# Patient Record
Sex: FEMALE | Race: OTHER | NOT HISPANIC OR LATINO | Employment: FULL TIME | URBAN - METROPOLITAN AREA
[De-identification: names, ages, dates, MRNs, and addresses within clinical notes are randomized per-mention and may not be internally consistent; named-entity substitution may affect disease eponyms.]

---

## 2025-04-16 ENCOUNTER — APPOINTMENT (OUTPATIENT)
Dept: RADIOLOGY | Facility: HOSPITAL | Age: 30
DRG: 156 | End: 2025-04-16
Payer: COMMERCIAL

## 2025-04-16 ENCOUNTER — HOSPITAL ENCOUNTER (INPATIENT)
Facility: HOSPITAL | Age: 30
LOS: 2 days | Discharge: HOME | DRG: 156 | End: 2025-04-19
Attending: EMERGENCY MEDICINE | Admitting: INTERNAL MEDICINE
Payer: COMMERCIAL

## 2025-04-16 DIAGNOSIS — R25.2 TRISMUS: ICD-10-CM

## 2025-04-16 DIAGNOSIS — K11.20 PAROTIDITIS: Primary | ICD-10-CM

## 2025-04-16 PROBLEM — B34.9 VIRAL SYNDROME: Status: ACTIVE | Noted: 2025-04-16

## 2025-04-16 PROBLEM — J45.20 MILD INTERMITTENT ASTHMA: Status: ACTIVE | Noted: 2025-04-16

## 2025-04-16 PROBLEM — R53.82 CHRONIC FATIGUE: Status: ACTIVE | Noted: 2025-04-16

## 2025-04-16 PROBLEM — R11.2 NAUSEA AND VOMITING: Status: ACTIVE | Noted: 2025-04-16

## 2025-04-16 PROBLEM — R22.0 SWELLING OF LEFT SIDE OF FACE: Status: ACTIVE | Noted: 2025-04-08

## 2025-04-16 PROBLEM — G47.00 INSOMNIA: Status: ACTIVE | Noted: 2025-04-16

## 2025-04-16 PROBLEM — R19.7 DIARRHEA: Status: ACTIVE | Noted: 2025-04-16

## 2025-04-16 PROBLEM — J45.909 ASTHMA: Status: ACTIVE | Noted: 2025-04-16

## 2025-04-16 PROBLEM — R71.8 MICROCYTOSIS: Status: ACTIVE | Noted: 2025-04-16

## 2025-04-16 LAB
ALBUMIN SERPL BCP-MCNC: 4.7 G/DL (ref 3.4–5)
ALP SERPL-CCNC: 28 U/L (ref 33–110)
ALT SERPL W P-5'-P-CCNC: 29 U/L (ref 7–45)
AMYLASE SERPL-CCNC: 64 U/L (ref 29–103)
ANION GAP SERPL CALC-SCNC: 14 MMOL/L (ref 10–20)
AST SERPL W P-5'-P-CCNC: 26 U/L (ref 9–39)
BASOPHILS # BLD AUTO: 0.03 X10*3/UL (ref 0–0.1)
BASOPHILS NFR BLD AUTO: 0.6 %
BILIRUB SERPL-MCNC: 0.4 MG/DL (ref 0–1.2)
BUN SERPL-MCNC: 11 MG/DL (ref 6–23)
CALCIUM SERPL-MCNC: 9.3 MG/DL (ref 8.6–10.3)
CHLORIDE SERPL-SCNC: 102 MMOL/L (ref 98–107)
CO2 SERPL-SCNC: 20 MMOL/L (ref 21–32)
CREAT SERPL-MCNC: 0.63 MG/DL (ref 0.5–1.05)
CRP SERPL-MCNC: 1.04 MG/DL
EGFRCR SERPLBLD CKD-EPI 2021: >90 ML/MIN/1.73M*2
EOSINOPHIL # BLD AUTO: 0.01 X10*3/UL (ref 0–0.7)
EOSINOPHIL NFR BLD AUTO: 0.2 %
ERYTHROCYTE [DISTWIDTH] IN BLOOD BY AUTOMATED COUNT: 16.8 % (ref 11.5–14.5)
ERYTHROCYTE [SEDIMENTATION RATE] IN BLOOD BY WESTERGREN METHOD: 57 MM/H (ref 0–20)
GLUCOSE SERPL-MCNC: 91 MG/DL (ref 74–99)
HCT VFR BLD AUTO: 46.1 % (ref 36–46)
HGB BLD-MCNC: 13.8 G/DL (ref 12–16)
IMM GRANULOCYTES # BLD AUTO: 0.03 X10*3/UL (ref 0–0.7)
IMM GRANULOCYTES NFR BLD AUTO: 0.6 % (ref 0–0.9)
LYMPHOCYTES # BLD AUTO: 0.37 X10*3/UL (ref 1.2–4.8)
LYMPHOCYTES NFR BLD AUTO: 7.6 %
MCH RBC QN AUTO: 21 PG (ref 26–34)
MCHC RBC AUTO-ENTMCNC: 29.9 G/DL (ref 32–36)
MCV RBC AUTO: 70 FL (ref 80–100)
MONOCYTES # BLD AUTO: 0.54 X10*3/UL (ref 0.1–1)
MONOCYTES NFR BLD AUTO: 11.1 %
NEUTROPHILS # BLD AUTO: 3.88 X10*3/UL (ref 1.2–7.7)
NEUTROPHILS NFR BLD AUTO: 79.9 %
NRBC BLD-RTO: 0 /100 WBCS (ref 0–0)
PLATELET # BLD AUTO: 312 X10*3/UL (ref 150–450)
POTASSIUM SERPL-SCNC: 4.2 MMOL/L (ref 3.5–5.3)
PROT SERPL-MCNC: 8.2 G/DL (ref 6.4–8.2)
RBC # BLD AUTO: 6.58 X10*6/UL (ref 4–5.2)
SODIUM SERPL-SCNC: 132 MMOL/L (ref 136–145)
WBC # BLD AUTO: 4.9 X10*3/UL (ref 4.4–11.3)

## 2025-04-16 PROCEDURE — G0378 HOSPITAL OBSERVATION PER HR: HCPCS

## 2025-04-16 PROCEDURE — 99223 1ST HOSP IP/OBS HIGH 75: CPT | Performed by: INTERNAL MEDICINE

## 2025-04-16 PROCEDURE — 2500000004 HC RX 250 GENERAL PHARMACY W/ HCPCS (ALT 636 FOR OP/ED): Performed by: NURSE PRACTITIONER

## 2025-04-16 PROCEDURE — 86140 C-REACTIVE PROTEIN: CPT | Performed by: NURSE PRACTITIONER

## 2025-04-16 PROCEDURE — 96367 TX/PROPH/DG ADDL SEQ IV INF: CPT

## 2025-04-16 PROCEDURE — 36415 COLL VENOUS BLD VENIPUNCTURE: CPT | Performed by: NURSE PRACTITIONER

## 2025-04-16 PROCEDURE — 87081 CULTURE SCREEN ONLY: CPT | Mod: PARLAB | Performed by: INTERNAL MEDICINE

## 2025-04-16 PROCEDURE — 99285 EMERGENCY DEPT VISIT HI MDM: CPT | Performed by: EMERGENCY MEDICINE

## 2025-04-16 PROCEDURE — 85025 COMPLETE CBC W/AUTO DIFF WBC: CPT | Performed by: NURSE PRACTITIONER

## 2025-04-16 PROCEDURE — 70491 CT SOFT TISSUE NECK W/DYE: CPT

## 2025-04-16 PROCEDURE — 87651 STREP A DNA AMP PROBE: CPT | Performed by: INTERNAL MEDICINE

## 2025-04-16 PROCEDURE — 80053 COMPREHEN METABOLIC PANEL: CPT | Performed by: NURSE PRACTITIONER

## 2025-04-16 PROCEDURE — 70491 CT SOFT TISSUE NECK W/DYE: CPT | Performed by: RADIOLOGY

## 2025-04-16 PROCEDURE — 85652 RBC SED RATE AUTOMATED: CPT | Performed by: NURSE PRACTITIONER

## 2025-04-16 PROCEDURE — 2550000001 HC RX 255 CONTRASTS: Performed by: NURSE PRACTITIONER

## 2025-04-16 PROCEDURE — 96365 THER/PROPH/DIAG IV INF INIT: CPT

## 2025-04-16 PROCEDURE — 82150 ASSAY OF AMYLASE: CPT | Performed by: EMERGENCY MEDICINE

## 2025-04-16 RX ORDER — KETOROLAC TROMETHAMINE 30 MG/ML
15 INJECTION, SOLUTION INTRAMUSCULAR; INTRAVENOUS EVERY 6 HOURS PRN
Status: DISCONTINUED | OUTPATIENT
Start: 2025-04-16 | End: 2025-04-19 | Stop reason: HOSPADM

## 2025-04-16 RX ORDER — VANCOMYCIN HYDROCHLORIDE 1 G/20ML
INJECTION, POWDER, LYOPHILIZED, FOR SOLUTION INTRAVENOUS DAILY PRN
Status: DISCONTINUED | OUTPATIENT
Start: 2025-04-16 | End: 2025-04-18

## 2025-04-16 RX ORDER — LIDOCAINE HYDROCHLORIDE 20 MG/ML
1.25 SOLUTION OROPHARYNGEAL AS NEEDED
Status: DISCONTINUED | OUTPATIENT
Start: 2025-04-16 | End: 2025-04-19 | Stop reason: HOSPADM

## 2025-04-16 RX ORDER — CEFAZOLIN SODIUM 2 G/100ML
2 INJECTION, SOLUTION INTRAVENOUS ONCE
Status: COMPLETED | OUTPATIENT
Start: 2025-04-16 | End: 2025-04-16

## 2025-04-16 RX ORDER — ENOXAPARIN SODIUM 100 MG/ML
40 INJECTION SUBCUTANEOUS EVERY 24 HOURS
Status: DISCONTINUED | OUTPATIENT
Start: 2025-04-16 | End: 2025-04-19 | Stop reason: HOSPADM

## 2025-04-16 RX ORDER — ONDANSETRON HYDROCHLORIDE 2 MG/ML
4 INJECTION, SOLUTION INTRAVENOUS EVERY 6 HOURS PRN
Status: DISCONTINUED | OUTPATIENT
Start: 2025-04-16 | End: 2025-04-19 | Stop reason: HOSPADM

## 2025-04-16 RX ORDER — ACETAMINOPHEN 325 MG/1
975 TABLET ORAL EVERY 6 HOURS PRN
Status: DISCONTINUED | OUTPATIENT
Start: 2025-04-16 | End: 2025-04-19 | Stop reason: HOSPADM

## 2025-04-16 RX ADMIN — AMPICILLIN SODIUM AND SULBACTAM SODIUM 3 G: 2; 1 INJECTION, POWDER, FOR SOLUTION INTRAMUSCULAR; INTRAVENOUS at 23:43

## 2025-04-16 RX ADMIN — IOHEXOL 75 ML: 350 INJECTION, SOLUTION INTRAVENOUS at 19:10

## 2025-04-16 RX ADMIN — CEFAZOLIN SODIUM 2 G: 2 INJECTION, SOLUTION INTRAVENOUS at 22:06

## 2025-04-16 ASSESSMENT — PAIN SCALES - GENERAL
PAINLEVEL_OUTOF10: 0 - NO PAIN
PAINLEVEL_OUTOF10: 8

## 2025-04-16 ASSESSMENT — COLUMBIA-SUICIDE SEVERITY RATING SCALE - C-SSRS
2. HAVE YOU ACTUALLY HAD ANY THOUGHTS OF KILLING YOURSELF?: NO
6. HAVE YOU EVER DONE ANYTHING, STARTED TO DO ANYTHING, OR PREPARED TO DO ANYTHING TO END YOUR LIFE?: NO
1. IN THE PAST MONTH, HAVE YOU WISHED YOU WERE DEAD OR WISHED YOU COULD GO TO SLEEP AND NOT WAKE UP?: NO

## 2025-04-16 ASSESSMENT — PAIN DESCRIPTION - PAIN TYPE: TYPE: ACUTE PAIN

## 2025-04-16 ASSESSMENT — PAIN - FUNCTIONAL ASSESSMENT: PAIN_FUNCTIONAL_ASSESSMENT: 0-10

## 2025-04-16 ASSESSMENT — PAIN DESCRIPTION - ORIENTATION: ORIENTATION: LEFT

## 2025-04-16 ASSESSMENT — PAIN DESCRIPTION - LOCATION: LOCATION: FACE

## 2025-04-16 NOTE — ED TRIAGE NOTES
Pt coming in for swelling on the left side of her face. Had an ultrasound on the her face. Has been going on for around 6 weeks. Has been having headaches. Swelling will go from her eye down to her neck. ENT told her to come in if it gets worse and it got worse last night. States that it hurts to swallow.

## 2025-04-16 NOTE — ED TRIAGE NOTES
Secondary to patient volumes and overcrowding, I performed a brief medical screening exam of the patient in triage, as the patient awaits space in the main ED.    History of Present Illness:  Cipriano Zaidi presents with   Chief Complaint   Patient presents with    Facial Swelling     Pt coming in for swelling on the left side of her face. Had an ultrasound on the her face. Has been going on for around 6 weeks. Has been having headaches. Swelling will go from her eye down to her neck. ENT told her to come in if it gets worse and it got worse last night. States that it hurts to swallow.          Physical Exam:  General - In no acute distress  Respiratory - Breathing comfortably  Cardiac - Normal S1, S2, no m/g/r  Neurologic - Moving all extremities      Medical Decision Making:  Patient will require further evaluation in the main ED.    Initial diagnostic tests were ordered from triage.      The patient demonstrates understanding that this initial evaluation is a brief medical screening exam and the expectation is that they await for space in the main ED to be further evaluated.  The patient understands that, if they leave prior to further evaluation in the main ED after this initial evaluation in triage, they are doing so under their own accord knowing that their evaluation/work-up is not yet complete. The patient also understands that any preliminary diagnostic results, including abnormalities, may not be shared with them, if they choose to leave prior to further evaluation in the main ED.

## 2025-04-17 LAB — S PYO DNA THROAT QL NAA+PROBE: NOT DETECTED

## 2025-04-17 PROCEDURE — 1100000001 HC PRIVATE ROOM DAILY

## 2025-04-17 PROCEDURE — 99222 1ST HOSP IP/OBS MODERATE 55: CPT | Performed by: STUDENT IN AN ORGANIZED HEALTH CARE EDUCATION/TRAINING PROGRAM

## 2025-04-17 PROCEDURE — 2500000001 HC RX 250 WO HCPCS SELF ADMINISTERED DRUGS (ALT 637 FOR MEDICARE OP): Performed by: INTERNAL MEDICINE

## 2025-04-17 PROCEDURE — 99232 SBSQ HOSP IP/OBS MODERATE 35: CPT | Performed by: INTERNAL MEDICINE

## 2025-04-17 PROCEDURE — 2500000004 HC RX 250 GENERAL PHARMACY W/ HCPCS (ALT 636 FOR OP/ED): Performed by: INTERNAL MEDICINE

## 2025-04-17 RX ORDER — CETIRIZINE HYDROCHLORIDE 10 MG/1
10 TABLET ORAL DAILY
Status: DISCONTINUED | OUTPATIENT
Start: 2025-04-17 | End: 2025-04-19 | Stop reason: HOSPADM

## 2025-04-17 RX ORDER — L. ACIDOPHILUS/L.BULGARICUS 1MM CELL
1 TABLET ORAL DAILY
Status: DISCONTINUED | OUTPATIENT
Start: 2025-04-17 | End: 2025-04-19 | Stop reason: HOSPADM

## 2025-04-17 RX ORDER — LORATADINE 10 MG/1
10 TABLET ORAL DAILY
COMMUNITY

## 2025-04-17 RX ORDER — ALBUTEROL SULFATE 0.63 MG/3ML
0.63 SOLUTION RESPIRATORY (INHALATION) EVERY 6 HOURS PRN
COMMUNITY

## 2025-04-17 RX ORDER — ALBUTEROL SULFATE 0.63 MG/3ML
0.63 SOLUTION RESPIRATORY (INHALATION) EVERY 6 HOURS PRN
Status: DISCONTINUED | OUTPATIENT
Start: 2025-04-17 | End: 2025-04-19 | Stop reason: HOSPADM

## 2025-04-17 RX ADMIN — AMPICILLIN SODIUM AND SULBACTAM SODIUM 3 G: 2; 1 INJECTION, POWDER, FOR SOLUTION INTRAMUSCULAR; INTRAVENOUS at 20:15

## 2025-04-17 RX ADMIN — ACETAMINOPHEN 975 MG: 325 TABLET, FILM COATED ORAL at 22:55

## 2025-04-17 RX ADMIN — VANCOMYCIN HYDROCHLORIDE 1500 MG: 1.5 INJECTION, POWDER, LYOPHILIZED, FOR SOLUTION INTRAVENOUS at 23:37

## 2025-04-17 RX ADMIN — AMPICILLIN SODIUM AND SULBACTAM SODIUM 3 G: 2; 1 INJECTION, POWDER, FOR SOLUTION INTRAMUSCULAR; INTRAVENOUS at 06:21

## 2025-04-17 RX ADMIN — CETIRIZINE HYDROCHLORIDE 10 MG: 10 TABLET, FILM COATED ORAL at 13:17

## 2025-04-17 RX ADMIN — ACETAMINOPHEN 975 MG: 325 TABLET, FILM COATED ORAL at 09:44

## 2025-04-17 RX ADMIN — KETOROLAC TROMETHAMINE 15 MG: 30 INJECTION, SOLUTION INTRAMUSCULAR at 12:32

## 2025-04-17 RX ADMIN — VANCOMYCIN HYDROCHLORIDE 1500 MG: 1.5 INJECTION, POWDER, LYOPHILIZED, FOR SOLUTION INTRAVENOUS at 13:10

## 2025-04-17 RX ADMIN — ACETAMINOPHEN 975 MG: 325 TABLET, FILM COATED ORAL at 00:21

## 2025-04-17 RX ADMIN — AMPICILLIN SODIUM AND SULBACTAM SODIUM 3 G: 2; 1 INJECTION, POWDER, FOR SOLUTION INTRAMUSCULAR; INTRAVENOUS at 13:09

## 2025-04-17 RX ADMIN — VANCOMYCIN HYDROCHLORIDE 1500 MG: 1.5 INJECTION, POWDER, LYOPHILIZED, FOR SOLUTION INTRAVENOUS at 00:21

## 2025-04-17 RX ADMIN — KETOROLAC TROMETHAMINE 15 MG: 30 INJECTION, SOLUTION INTRAMUSCULAR at 03:28

## 2025-04-17 RX ADMIN — Medication 1 TABLET: at 20:15

## 2025-04-17 SDOH — SOCIAL STABILITY: SOCIAL INSECURITY: HAVE YOU HAD ANY THOUGHTS OF HARMING ANYONE ELSE?: NO

## 2025-04-17 SDOH — SOCIAL STABILITY: SOCIAL INSECURITY
WITHIN THE LAST YEAR, HAVE YOU BEEN RAPED OR FORCED TO HAVE ANY KIND OF SEXUAL ACTIVITY BY YOUR PARTNER OR EX-PARTNER?: NO

## 2025-04-17 SDOH — ECONOMIC STABILITY: FOOD INSECURITY: WITHIN THE PAST 12 MONTHS, THE FOOD YOU BOUGHT JUST DIDN'T LAST AND YOU DIDN'T HAVE MONEY TO GET MORE.: NEVER TRUE

## 2025-04-17 SDOH — SOCIAL STABILITY: SOCIAL INSECURITY
WITHIN THE LAST YEAR, HAVE YOU BEEN KICKED, HIT, SLAPPED, OR OTHERWISE PHYSICALLY HURT BY YOUR PARTNER OR EX-PARTNER?: NO

## 2025-04-17 SDOH — ECONOMIC STABILITY: FOOD INSECURITY: WITHIN THE PAST 12 MONTHS, YOU WORRIED THAT YOUR FOOD WOULD RUN OUT BEFORE YOU GOT THE MONEY TO BUY MORE.: NEVER TRUE

## 2025-04-17 SDOH — HEALTH STABILITY: PHYSICAL HEALTH
HOW OFTEN DO YOU NEED TO HAVE SOMEONE HELP YOU WHEN YOU READ INSTRUCTIONS, PAMPHLETS, OR OTHER WRITTEN MATERIAL FROM YOUR DOCTOR OR PHARMACY?: NEVER

## 2025-04-17 SDOH — SOCIAL STABILITY: SOCIAL INSECURITY: WITHIN THE LAST YEAR, HAVE YOU BEEN AFRAID OF YOUR PARTNER OR EX-PARTNER?: NO

## 2025-04-17 SDOH — SOCIAL STABILITY: SOCIAL INSECURITY: WITHIN THE LAST YEAR, HAVE YOU BEEN HUMILIATED OR EMOTIONALLY ABUSED IN OTHER WAYS BY YOUR PARTNER OR EX-PARTNER?: NO

## 2025-04-17 SDOH — SOCIAL STABILITY: SOCIAL INSECURITY: DOES ANYONE TRY TO KEEP YOU FROM HAVING/CONTACTING OTHER FRIENDS OR DOING THINGS OUTSIDE YOUR HOME?: NO

## 2025-04-17 SDOH — SOCIAL STABILITY: SOCIAL INSECURITY: DO YOU FEEL UNSAFE GOING BACK TO THE PLACE WHERE YOU ARE LIVING?: NO

## 2025-04-17 SDOH — ECONOMIC STABILITY: INCOME INSECURITY: IN THE PAST 12 MONTHS HAS THE ELECTRIC, GAS, OIL, OR WATER COMPANY THREATENED TO SHUT OFF SERVICES IN YOUR HOME?: NO

## 2025-04-17 SDOH — SOCIAL STABILITY: SOCIAL INSECURITY: HAVE YOU HAD THOUGHTS OF HARMING ANYONE ELSE?: NO

## 2025-04-17 SDOH — SOCIAL STABILITY: SOCIAL INSECURITY: ARE YOU OR HAVE YOU BEEN THREATENED OR ABUSED PHYSICALLY, EMOTIONALLY, OR SEXUALLY BY ANYONE?: NO

## 2025-04-17 SDOH — SOCIAL STABILITY: SOCIAL INSECURITY: WERE YOU ABLE TO COMPLETE ALL THE BEHAVIORAL HEALTH SCREENINGS?: YES

## 2025-04-17 SDOH — SOCIAL STABILITY: SOCIAL INSECURITY: DO YOU FEEL ANYONE HAS EXPLOITED OR TAKEN ADVANTAGE OF YOU FINANCIALLY OR OF YOUR PERSONAL PROPERTY?: NO

## 2025-04-17 SDOH — SOCIAL STABILITY: SOCIAL INSECURITY: ABUSE: ADULT

## 2025-04-17 SDOH — SOCIAL STABILITY: SOCIAL INSECURITY: HAS ANYONE EVER THREATENED TO HURT YOUR FAMILY OR YOUR PETS?: NO

## 2025-04-17 ASSESSMENT — LIFESTYLE VARIABLES
SKIP TO QUESTIONS 9-10: 1
AUDIT-C TOTAL SCORE: 0
HOW OFTEN DO YOU HAVE A DRINK CONTAINING ALCOHOL: NEVER
AUDIT-C TOTAL SCORE: 0
SUBSTANCE_ABUSE_PAST_12_MONTHS: NO
PRESCIPTION_ABUSE_PAST_12_MONTHS: NO
HOW OFTEN DO YOU HAVE 6 OR MORE DRINKS ON ONE OCCASION: NEVER
HOW MANY STANDARD DRINKS CONTAINING ALCOHOL DO YOU HAVE ON A TYPICAL DAY: PATIENT DOES NOT DRINK

## 2025-04-17 ASSESSMENT — COGNITIVE AND FUNCTIONAL STATUS - GENERAL
MOBILITY SCORE: 24
PATIENT BASELINE BEDBOUND: NO
MOBILITY SCORE: 24
DAILY ACTIVITIY SCORE: 24
DAILY ACTIVITIY SCORE: 24

## 2025-04-17 ASSESSMENT — ACTIVITIES OF DAILY LIVING (ADL)
HEARING - LEFT EAR: FUNCTIONAL
JUDGMENT_ADEQUATE_SAFELY_COMPLETE_DAILY_ACTIVITIES: YES
BATHING: INDEPENDENT
HEARING - RIGHT EAR: FUNCTIONAL
DRESSING YOURSELF: INDEPENDENT
TOILETING: INDEPENDENT
ADEQUATE_TO_COMPLETE_ADL: YES
PATIENT'S MEMORY ADEQUATE TO SAFELY COMPLETE DAILY ACTIVITIES?: YES
FEEDING YOURSELF: INDEPENDENT
WALKS IN HOME: INDEPENDENT
GROOMING: INDEPENDENT
LACK_OF_TRANSPORTATION: NO

## 2025-04-17 ASSESSMENT — PAIN - FUNCTIONAL ASSESSMENT
PAIN_FUNCTIONAL_ASSESSMENT: 0-10

## 2025-04-17 ASSESSMENT — PAIN DESCRIPTION - LOCATION
LOCATION: FACE
LOCATION: FACE
LOCATION: HEAD
LOCATION: FACE
LOCATION: JAW

## 2025-04-17 ASSESSMENT — PATIENT HEALTH QUESTIONNAIRE - PHQ9
SUM OF ALL RESPONSES TO PHQ9 QUESTIONS 1 & 2: 0
1. LITTLE INTEREST OR PLEASURE IN DOING THINGS: NOT AT ALL
2. FEELING DOWN, DEPRESSED OR HOPELESS: NOT AT ALL

## 2025-04-17 ASSESSMENT — PAIN SCALES - GENERAL
PAINLEVEL_OUTOF10: 4
PAINLEVEL_OUTOF10: 0 - NO PAIN
PAINLEVEL_OUTOF10: 2
PAINLEVEL_OUTOF10: 6
PAINLEVEL_OUTOF10: 4
PAINLEVEL_OUTOF10: 6
PAINLEVEL_OUTOF10: 0 - NO PAIN
PAINLEVEL_OUTOF10: 7

## 2025-04-17 ASSESSMENT — PAIN DESCRIPTION - ORIENTATION
ORIENTATION: LEFT
ORIENTATION: LEFT

## 2025-04-17 NOTE — CONSULTS
"Infectious disease progress note  Subjective   Cipriano Zaidi is a 29 y.o. female who presents to the hospital with complaints of L sided facial swelling. Symptoms have been ongoing for about 6 weeks.  Patient has undergone multiple courses of antibiotics including 2 course of azithromycin, Bactrim, and a course of Augmentin.  Patient presented to the hospital due to subjective fevers, increased pain in throat, increased swelling on the left side of her face, and difficulty swallowing.  In the ED patient was noted to have elevated ESR/CRP and CT imaging showing evidence of parotitis.  Patient denying any current fevers, chills, shortness of breath, difficulty breathing, past dental work, recent piercings, or any other new/acute symptoms.  Patient recently treated for ear infections with Augmentin.      Objective   Range of Vitals (last 24 hours)  Heart Rate:  []   Temp:  [36 °C (96.8 °F)-37.6 °C (99.7 °F)]   Resp:  [16-18]   BP: (116-141)/(58-83)   Height:  [154.9 cm (5' 1\")]   Weight:  [81.6 kg (180 lb)]   SpO2:  [95 %-99 %]   Daily Weight  04/16/25 : 81.6 kg (180 lb)    Body mass index is 34.01 kg/m².      Physical Exam  Physical Exam:  General:  Pleasant and cooperative. No apparent distress.  HEENT: Swollen submandibular and preauricular lymph nodes, left-sided facial swelling  Chest:  Clear to auscultation bilaterally. No wheezes, rales, or rhonchi.  CV:  Regular rate and rhythm. No murmurs    Abdomen: Abdomen is soft, non-tender, non-distended. BS +   Extremities:  No lower extremity edema or cyanosis.   Neurological:  AAOx3. No focal deficits.  Skin:  Warm and dry.        Relevant Results  Labs  Lab Results   Component Value Date    WBC 4.9 04/16/2025    HGB 13.8 04/16/2025    HCT 46.1 (H) 04/16/2025    MCV 70 (L) 04/16/2025     04/16/2025     Lab Results   Component Value Date    GLUCOSE 91 04/16/2025    CALCIUM 9.3 04/16/2025     (L) 04/16/2025    K 4.2 04/16/2025    CO2 20 (L) " 04/16/2025     04/16/2025    BUN 11 04/16/2025    CREATININE 0.63 04/16/2025   ESR: --  Lab Results   Component Value Date    SEDRATE 57 (H) 04/16/2025     Lab Results   Component Value Date    CRP 1.04 (H) 04/16/2025     Lab Results   Component Value Date    ALT 29 04/16/2025    AST 26 04/16/2025    ALKPHOS 28 (L) 04/16/2025    BILITOT 0.4 04/16/2025       Assessment/Plan   #Peritonitis, failed outpatient management  - Agree with ENT consult, evaluate for possible salivary stones  - Continue with Unasyn and vancomycin (started/17/25), may de-escalate Vanco if MRSA negative  - ID will continue to follow    I reviewed and interpreted all lab test imaging studies and documentations from other healthcare providers  I am monitoring for antibiotic side effects and toxicity     Michael Hatfield MD

## 2025-04-17 NOTE — H&P
"                                             Hospital Medicine History & Physical       Patient Name: Cipriano Zaidi   YOB: 1995    Subjective:    Cipriano Zaidi is a 29 y.o. female who presents to the hospital with complaints of L sided facial swelling. Symptoms have been ongoing for about 6 weeks. She has been treated with 2 courses of Azithromycin filled on 3/24 and 3/29. She also saw Dr. Andrew Rivas (ENT) and was started on Bactrim on 4/8. She was instructed to come to the ED if symptoms worsened and she felt like they did today so she came to our ED. She has had subjective fevers. She has pain in her throat, especially with swallowing. CT imaging showed evidence of parotitis     A 10 point ROS was completed and is negative expect as stated in HPI.     Past Medical History:  Asthma  Seasonal Allergies   Chronic Microcytosis without anemia     Past Surgical History:  None    Social History: Tobacco - Never, Alcohol - none, Recreational Drugs - none    Family History: family history is not on file.     Objective:    /83 (BP Location: Right arm)   Pulse (!) 104   Temp 36 °C (96.8 °F) (Tympanic)   Resp 16   Ht 1.549 m (5' 1\")   Wt 81.6 kg (180 lb)   SpO2 95%   BMI 34.01 kg/m²     Physical Exam:    GENERAL: well developed, non-toxic, NAD, alert & cooperative  HEENT: L facial swelling.   CARDIAC: regular rate & rhythm, S1/S2  PULMONARY: CTA b/l, no respiratory distress, - wheezes  ABDOMEN: soft, non-tender, non-distended  MSK: no peripheral edema, no obvious deformity  NEURO: A&O X 3, non-focal, CN II-XII grossly intact  SKIN: Warm and dry, no lesions, no rashes.  PSYCH: appropriate mood & affect     Assessment/Plan:    Parotiditis   Failed Outpatient management   Asthma      Will cover broadly with Unasyn and IV Vanc. Will check MRSA swab and if negative will stop Vanc. If there isnt improvement in the next 24 -48 hours consider non-infectious etiologies. +MMR vaccine. Consider ENT " consult if available. No fluid collections noted on imaging so nothing invasive needed right now.       DVT Prophylaxis: per protocol  Code Status: FULL CODE  Disposition: Home      Edvin Park, [facility]

## 2025-04-17 NOTE — CARE PLAN
The patient's goals for the shift include sleep, comfort and safety     The clinical goals for the shift include Patinet will remain safe and free from any injuyr for entire shift.

## 2025-04-17 NOTE — ED PROVIDER NOTES
HPI   Chief Complaint   Patient presents with    Facial Swelling     Pt coming in for swelling on the left side of her face. Had an ultrasound on the her face. Has been going on for around 6 weeks. Has been having headaches. Swelling will go from her eye down to her neck. ENT told her to come in if it gets worse and it got worse last night. States that it hurts to swallow.          Patient is a healthy nontoxic-appearing 29-year-old female past medical history of asthma, insomnia, diarrhea, macrocytosis, mild intermittent asthma, viral syndrome, presents emergency room today for complaint of left-sided facial swelling.  Patient states  symptoms have been ongoing getting worse over the past 6 weeks.  Patient states she has been on course of Augmentin, azithromycin, currently on Bactrim.  Patient symptoms have persisted and became worse today.  Patient states she does have some pain opening closing the jaw.  Patient denies any injuries trauma or falls, headache pain, visual disturbances, numbness or tingling.  Patient denies any difficulty controlling secretions or voice changes.  Patient denies any chest pain, shortness breath difficulty breathing, abdominal pain, nausea or vomiting, fever, shaking, or chills.              Patient History   Medical History[1]  Surgical History[2]  Family History[3]  Social History[4]    Physical Exam   ED Triage Vitals [04/16/25 1647]   Temp Heart Rate Resp BP   36 °C (96.8 °F) (!) 106 16 141/67      SpO2 Temp Source Heart Rate Source Patient Position   99 % Tympanic Monitor Sitting      BP Location FiO2 (%)     Right arm --       Physical Exam  Vitals and nursing note reviewed.   Constitutional:       General: She is not in acute distress.     Appearance: Normal appearance. She is well-developed and normal weight. She is not ill-appearing, toxic-appearing or diaphoretic.   HENT:      Head: Normocephalic and atraumatic.      Nose: No congestion or rhinorrhea.      Mouth/Throat:       Mouth: Mucous membranes are moist.      Pharynx: No oropharyngeal exudate or posterior oropharyngeal erythema.   Eyes:      General: No visual field deficit or scleral icterus.        Right eye: No discharge.         Left eye: No discharge.      Extraocular Movements: Extraocular movements intact.      Right eye: Normal extraocular motion and no nystagmus.      Left eye: Normal extraocular motion and no nystagmus.      Conjunctiva/sclera: Conjunctivae normal.      Pupils: Pupils are equal, round, and reactive to light. Pupils are equal.      Right eye: Pupil is round and reactive.      Left eye: Pupil is round and reactive.   Neck:      Vascular: No carotid bruit.      Comments: Reproducible tenderness upon palpation to the left jaw with mild trismus present, anterior cervical lymph node tenderness upon palpation as well, no stridor or wheezing auscultated over the neck, managing secretions appropriately  Cardiovascular:      Rate and Rhythm: Normal rate and regular rhythm.      Pulses: Normal pulses.      Heart sounds: Normal heart sounds. No murmur heard.     No friction rub. No gallop.   Pulmonary:      Effort: Pulmonary effort is normal. No respiratory distress.      Breath sounds: Normal breath sounds. No stridor. No wheezing, rhonchi or rales.   Chest:      Chest wall: No tenderness.   Musculoskeletal:         General: No swelling. Normal range of motion.      Cervical back: Normal range of motion and neck supple. Tenderness present. No rigidity.   Lymphadenopathy:      Cervical: Cervical adenopathy present.   Skin:     General: Skin is warm and dry.      Capillary Refill: Capillary refill takes less than 2 seconds.   Neurological:      General: No focal deficit present.      Mental Status: She is alert and oriented to person, place, and time.      GCS: GCS eye subscore is 4. GCS verbal subscore is 5. GCS motor subscore is 6.      Cranial Nerves: No cranial nerve deficit, dysarthria or facial asymmetry.       Sensory: No sensory deficit.      Motor: No weakness.      Coordination: Romberg sign negative. Coordination normal.      Gait: Gait normal.      Deep Tendon Reflexes: Reflexes normal.           ED Course & MDM   ED Course as of 04/17/25 0256   Wed Apr 16, 2025 2058 CT of the neck reveals  IMPRESSION:  Asymmetric enlargement and edema of the left parotid gland concerning  for parotiditis; please clinically correlate.      Asymmetric thickening and edema of the left platysma muscle may be  infectious/inflammatory in etiology.      Prominent cervical lymph nodes may be reactive in nature.      No evidence of organized fluid collection to suggest abscess.   [EC]   2139 Patient seen and examined, this is a 29 years old very pleasant female patient presented to the emergency department with a chief complaint of left facial swelling and neck pain as well as difficulty swallowing and trismus.  Stated that her condition started few weeks ago, she had 2 courses of Augmentin with no relief, stated that today her pain started to get worse and involving the neck as well as the difficulty swallowing.  Denies any fever, chills, body aches, recent sick contacts, stated that she is able to swallow but with some difficulty.  Denies chest pain, shortness of breath, cough, abdominal pain, weakness, or numbness.  Stated that she was vaccinated during childhood.    Review of system: As stated above in the HPI section.    Physical exam revealed a 29 years old female patient does not appear to be in acute distress.    No drooling, in no acute respiratory distress, no stridor, or wheezes.  There is tenderness over the left parotid gland as well as the left submandibular and neck region.  Patient has trismus, uvula is midline, no tongue or lip swelling, patient has mild to moderate limited extension to the neck.  Cardiopulmonary as well as abdominal and neurologic exam are unremarkable.    We obtained basic labs, inflammatory markers are  elevated, no leukocytosis, CT soft tissue neck with IV contrast dye revealed swelling to the left side of the face involving the platysma muscle as well as the parotid gland.  Amylase is added to the workup.  Will start the patient on Ancef IV and the patient will be admitted to medicine team given her outpatient treatment failure as well as worsening symptoms. [ME]      ED Course User Index  [EC] FANNY Sorensen-KATY  [ME] Alin Izaguirre DO         Diagnoses as of 04/17/25 0256   Parotiditis   Trismus                 No data recorded     Marisela Coma Scale Score: 15 (04/16/25 2126 : Martha Stafford RN)                           Medical Decision Making  Given patient's pain presentation a thorough exam was performed.  On exam patient has Reproducible tenderness upon palpation to the left jaw with mild trismus present, anterior cervical lymph node tenderness upon palpation as well, no stridor or wheezing auscultated over the neck, managing secretions appropriately, remains hemodynamically stable during emergency evaluation, is afebrile, no adventitious lung sounds auscultated, speaking clearly no distress, cardiac sound auscultated regular, have low suspicion for airway compromise, peritonsillar abscess, epiglottitis, David's angina.  CT of the soft tissue neck was ordered. CT of the soft tissue the neck as interpreted by radiologist results listed above with concern for parotitis.  I do suspect this is the cause of patient's symptoms.  Given patient has had failed outpatient therapy discussion was had with patient regards to admission for IV therapy and observation which she was agreeable with.  Patient was started on Ancef in the emergency room.  I consulted Dr. Park regards to admission.  Dr. Park has agreed admit patient to his service and will manage inpatient care.    TIFFANIE Sorensen     Portions of this note were generated using digital voice recognition software, and may contain grammatical  errors      Procedure  Procedures       [1]   Past Medical History:  Diagnosis Date    Personal history of diseases of the blood and blood-forming organs and certain disorders involving the immune mechanism     History of anemia    Personal history of diseases of the skin and subcutaneous tissue     History of eczema    Personal history of other infectious and parasitic diseases     History of varicella    Personal history of other mental and behavioral disorders     History of anxiety   [2] No past surgical history on file.  [3] No family history on file.  [4]   Social History  Tobacco Use    Smoking status: Never     Passive exposure: Never    Smokeless tobacco: Never   Vaping Use    Vaping status: Never Used   Substance Use Topics    Alcohol use: Never    Drug use: Never        FANNY Sorensen-CNP  04/17/25 0256

## 2025-04-17 NOTE — PROGRESS NOTES
"                                               Patient Name: Cipriano Zaidi   YOB: 1995    Subjective:  Pt reports moderate to severe pain in her left lower face down her left neck, hurts to swallow.       Objective:    /58   Pulse 82   Temp 36.4 °C (97.5 °F)   Resp 18   Ht 1.549 m (5' 1\")   Wt 81.6 kg (180 lb)   SpO2 98%   BMI 34.01 kg/m²     Physical Exam:    GEN: Awake and alert. Not in acute distress.   HEENT: Left lower face swollen and tender, left neck tender  CARDIO: Regular rate and rhythm.  No murmurs, rubs, or gallops. No LE edema  RESP: Clear to auscultation b/l. No wheezes, rales or rhonchi. Good respiratory effort.   ABD: +BS x4, soft, non-tender. Not distended. No rebound or guarding.  MSK: Grossly normal inspection. No joint swelling or obvious deformities. ROM intact  NEURO: A&O X 3. CN II-XII are grossly intact. No focal deficits.   SKIN: Warm and dry, no lesions, no rashes.  PSYCH: Appropriate mood and affect, no hallucinations.     Scheduled medications  Scheduled Medications[1]  Continuous medications  Continuous Medications[2]  PRN medications  PRN Medications[3]     Assessment and Plan:  Cipriano Zaidi is a 29 y.o. female   Assessment & Plan  Parotiditis    # Parotiditis  # Failure of outpatient treatment  # Asthma    Admit and treat for parotiditis, failure of outpatient treatment with 2 courses Azithromycin and also Bactrim.  Administer Unasyn and Vancomycin.  Pain control with Tylenol and Toradol.  CT soft tissue neck reviewed, showed enlargement and edema of left parotid gland.  Consulted ENT and infectious disease for additional recommendations.    Chevy Page DO  Senior Attending Physician  Hospital Medicine  Clinical Instructor             [1] ampicillin-sulbactam, 3 g, intravenous, q6h  cetirizine, 10 mg, oral, Daily  enoxaparin, 40 mg, subcutaneous, q24h  vancomycin, 1,500 mg, intravenous, q12h    [2]    [3] PRN medications: acetaminophen, albuterol, " ketorolac, lidocaine, ondansetron, vancomycin

## 2025-04-17 NOTE — CONSULTS
Reason For Consult  Left parotitis    History Of Present Illness  Cipriano Zaidi is a 29 y.o. female presenting with 6-8 weeks of left parotid swelling.    Patient seen with her mother and aunt present.    The patient reports that she was otherwise in good health when she had an upper respiratory infection some 6 to 8 weeks ago.  She developed swelling of the left parotid gland that was quite tender.  She has had several rounds of antibiotics including 2 rounds of Augmentin, a round of azithromycin, and a round of Bactrim without resolution of swelling.  She was seen by an outside ENT who thought it may be a reactive lymph node and advised patients.  She has tried sialagogues, parotid massage, and heat without improvement.  She has had waxing and waning exacerbations of swelling involving the entirety of the left face track into the eye and more recently has had worsening pain and swelling along the left neck.     Past Medical History  She has a past medical history of Personal history of diseases of the blood and blood-forming organs and certain disorders involving the immune mechanism, Personal history of diseases of the skin and subcutaneous tissue, Personal history of other infectious and parasitic diseases, and Personal history of other mental and behavioral disorders.    Surgical History  She has no past surgical history on file.     Social History  She reports that she has never smoked. She has never been exposed to tobacco smoke. She has never used smokeless tobacco. She reports that she does not drink alcohol and does not use drugs.    Family History  Family History[1]     Allergies  Mucinex [guaifenesin]    Review of Systems  Negative except as noted above in H&P.     Physical Exam  CONSTITUTIONAL: Well appearing female who appears stated age.  PSYCHIATRIC: Alert, appropriate mood and affect.  RESPIRATORY: Normal inspiration and expiration and chest wall expansion; no use of accessory muscles to  "breathe.  VOICE: Clear speech without hoarseness. No stridor nor stertor.  HEAD, FACE, AND SKIN: Symmetric facial feature. No cutaneous masses or lesions were visualized. The parotid and submandibular glands were relatively normal to palpation. However, there is prominent isolated swelling in the preauricular space that is suspicious for lymphadenopathy with the surrounding parotid gland tender to manipulation but otherwise soft.  I am unable to milk saliva from the left parotid duct.  ORAL CAVITY: Lips were without lesions. Moist mucous membranes. No lesions appreciated along the gingiva, oral mucosa, nor tongue.  NECK: Visualization and palpation of the neck revealed no mass lesions, no thyromegaly or thyroid masses. No cutaneous lesions appreciated.  LYMPHATICS (CERVICAL): There were scattered palpable lymph nodes along the left jugulodigastric region with tenderness to palpation.  In the posterior triangle, submandibular triangle, jugulodigastric region, nor central neck.  NEUROLOGIC: Cranial nerves III-XII grossly intact and symmetric.     Last Recorded Vitals  Blood pressure 116/58, pulse 82, temperature 36.4 °C (97.5 °F), resp. rate 18, height 1.549 m (5' 1\"), weight 81.6 kg (180 lb), SpO2 98%.    Relevant Results  CT neck with contrast 4/17/2025:  Appears reviewed the patient's CT neck from this admission.  There is appears to be diffuse swelling of the parotid gland but unfortunately there is metal artifact (earrings) somewhat obscuring the area of concern on exam.  There are scattered lymph nodes throughout the neck bilaterally with greater burden noted on the left.    Lab Results   Component Value Date    WBC 4.9 04/16/2025    HGB 13.8 04/16/2025    HCT 46.1 (H) 04/16/2025    MCV 70 (L) 04/16/2025     04/16/2025      Throat swab negative for Strep.    Lab Results   Component Value Date    SEDRATE 57 (H) 04/16/2025       Assessment/Plan     29 y.o. female seen for left-sided parotitis that has not " resolved with multiple courses of antibiotics.  Patient exam concerning for lymphadenopathy of the parotid gland.  She has diffuse cervical lymphadenopathy of unclear etiology, potentially secondary to parotitis or potentially from lingering pharyngitis following upper respiratory infection.  Depending on the patient's recovery with IV antibiotics during this admission, we may need to consider FNA of the left parotid gland vs lymph node to confirm that this is a lymph node vs other tumor/lesion.  Alternatively a new CT neck with removal of patient's jewelry for better assessment to rule out abscess/lesion.    Will order testing for mononucleosis given prolonged course.    Prasanth Souza MD         [1] No family history on file.

## 2025-04-17 NOTE — CONSULTS
Vancomycin Dosing by Pharmacy- INITIAL    Cipriano Zaidi is a 29 y.o. year old female who Pharmacy has been consulted for vancomycin dosing for cellulitis, skin and soft tissue. Based on the patient's indication and renal status this patient will be dosed based on a goal AUC of 400-600.     Renal function is currently stable.    Visit Vitals  /83 (BP Location: Right arm)   Pulse (!) 104   Temp 36 °C (96.8 °F) (Tympanic)   Resp 16        Lab Results   Component Value Date    CREATININE 0.63 2025    CREATININE 0.71 2019        Patient weight is as follows:   Vitals:    25 1647   Weight: 81.6 kg (180 lb)       Cultures:  No results found for the encounter in last 14 days.        No intake/output data recorded.  I/O during current shift:  No intake/output data recorded.    Temp (24hrs), Av °C (96.8 °F), Min:36 °C (96.8 °F), Max:36 °C (96.8 °F)         Assessment/Plan     Patient will not be given a loading dose.  Will initiate vancomycin maintenance,  1500 mg every 12 hours.    This dosing regimen is predicted by [facility] to result in the following pharmacokinetic parameters:  Regimen: 1500 mg IV every 12 hours.  Start time: 23:17 on 2025  Exposure target: AUC24 (range)400-600 mg/L.hr   IOM51-54: 479 mg/L.hr  AUC24,ss: 551 mg/L.hr  Probability of AUC24 > 400: 81 %  Ctrough,ss: 15.9 mg/L  Probability of Ctrough,ss > 20: 33 %  Follow-up level will be ordered on  at 1000 unless clinically indicated sooner.  Will continue to monitor renal function daily while on vancomycin and order serum creatinine at least every 48 hours if not already ordered.  Follow for continued vancomycin needs, clinical response, and signs/symptoms of toxicity.       Homero Persaud RPh

## 2025-04-18 ENCOUNTER — APPOINTMENT (OUTPATIENT)
Dept: RADIOLOGY | Facility: HOSPITAL | Age: 30
DRG: 156 | End: 2025-04-18
Payer: COMMERCIAL

## 2025-04-18 LAB
ALBUMIN SERPL BCP-MCNC: 3.9 G/DL (ref 3.4–5)
ALP SERPL-CCNC: 28 U/L (ref 33–110)
ALT SERPL W P-5'-P-CCNC: 23 U/L (ref 7–45)
ANION GAP SERPL CALC-SCNC: 11 MMOL/L (ref 10–20)
AST SERPL W P-5'-P-CCNC: 19 U/L (ref 9–39)
BASOPHILS # BLD AUTO: 0.02 X10*3/UL (ref 0–0.1)
BASOPHILS NFR BLD AUTO: 0.5 %
BILIRUB SERPL-MCNC: 0.3 MG/DL (ref 0–1.2)
BUN SERPL-MCNC: 9 MG/DL (ref 6–23)
CALCIUM SERPL-MCNC: 8.7 MG/DL (ref 8.6–10.3)
CHLORIDE SERPL-SCNC: 106 MMOL/L (ref 98–107)
CO2 SERPL-SCNC: 24 MMOL/L (ref 21–32)
CREAT SERPL-MCNC: 0.47 MG/DL (ref 0.5–1.05)
EGFRCR SERPLBLD CKD-EPI 2021: >90 ML/MIN/1.73M*2
EOSINOPHIL # BLD AUTO: 0.13 X10*3/UL (ref 0–0.7)
EOSINOPHIL NFR BLD AUTO: 3.1 %
ERYTHROCYTE [DISTWIDTH] IN BLOOD BY AUTOMATED COUNT: 16.4 % (ref 11.5–14.5)
GLUCOSE SERPL-MCNC: 117 MG/DL (ref 74–99)
HCT VFR BLD AUTO: 44.1 % (ref 36–46)
HETEROPH AB SERPLBLD QL IA.RAPID: NEGATIVE
HGB BLD-MCNC: 13.1 G/DL (ref 12–16)
HOLD SPECIMEN: NORMAL
IMM GRANULOCYTES # BLD AUTO: 0 X10*3/UL (ref 0–0.7)
IMM GRANULOCYTES NFR BLD AUTO: 0 % (ref 0–0.9)
LYMPHOCYTES # BLD AUTO: 1.39 X10*3/UL (ref 1.2–4.8)
LYMPHOCYTES NFR BLD AUTO: 32.6 %
MCH RBC QN AUTO: 20.8 PG (ref 26–34)
MCHC RBC AUTO-ENTMCNC: 29.7 G/DL (ref 32–36)
MCV RBC AUTO: 70 FL (ref 80–100)
MONOCYTES # BLD AUTO: 0.54 X10*3/UL (ref 0.1–1)
MONOCYTES NFR BLD AUTO: 12.7 %
NEUTROPHILS # BLD AUTO: 2.18 X10*3/UL (ref 1.2–7.7)
NEUTROPHILS NFR BLD AUTO: 51.1 %
NRBC BLD-RTO: 0 /100 WBCS (ref 0–0)
PLATELET # BLD AUTO: 283 X10*3/UL (ref 150–450)
POTASSIUM SERPL-SCNC: 3.2 MMOL/L (ref 3.5–5.3)
PROT SERPL-MCNC: 7 G/DL (ref 6.4–8.2)
RBC # BLD AUTO: 6.31 X10*6/UL (ref 4–5.2)
SODIUM SERPL-SCNC: 138 MMOL/L (ref 136–145)
STAPHYLOCOCCUS SPEC CULT: NORMAL
VANCOMYCIN SERPL-MCNC: 7.8 UG/ML (ref 5–20)
WBC # BLD AUTO: 4.3 X10*3/UL (ref 4.4–11.3)

## 2025-04-18 PROCEDURE — 2500000001 HC RX 250 WO HCPCS SELF ADMINISTERED DRUGS (ALT 637 FOR MEDICARE OP): Performed by: INTERNAL MEDICINE

## 2025-04-18 PROCEDURE — 76536 US EXAM OF HEAD AND NECK: CPT

## 2025-04-18 PROCEDURE — 99232 SBSQ HOSP IP/OBS MODERATE 35: CPT | Performed by: STUDENT IN AN ORGANIZED HEALTH CARE EDUCATION/TRAINING PROGRAM

## 2025-04-18 PROCEDURE — 80053 COMPREHEN METABOLIC PANEL: CPT | Performed by: INTERNAL MEDICINE

## 2025-04-18 PROCEDURE — 80202 ASSAY OF VANCOMYCIN: CPT | Performed by: INTERNAL MEDICINE

## 2025-04-18 PROCEDURE — 36415 COLL VENOUS BLD VENIPUNCTURE: CPT | Performed by: STUDENT IN AN ORGANIZED HEALTH CARE EDUCATION/TRAINING PROGRAM

## 2025-04-18 PROCEDURE — 36415 COLL VENOUS BLD VENIPUNCTURE: CPT | Performed by: INTERNAL MEDICINE

## 2025-04-18 PROCEDURE — 2500000004 HC RX 250 GENERAL PHARMACY W/ HCPCS (ALT 636 FOR OP/ED): Mod: JZ | Performed by: STUDENT IN AN ORGANIZED HEALTH CARE EDUCATION/TRAINING PROGRAM

## 2025-04-18 PROCEDURE — 99232 SBSQ HOSP IP/OBS MODERATE 35: CPT | Performed by: INTERNAL MEDICINE

## 2025-04-18 PROCEDURE — 2500000004 HC RX 250 GENERAL PHARMACY W/ HCPCS (ALT 636 FOR OP/ED): Mod: JZ | Performed by: INTERNAL MEDICINE

## 2025-04-18 PROCEDURE — 85025 COMPLETE CBC W/AUTO DIFF WBC: CPT | Performed by: INTERNAL MEDICINE

## 2025-04-18 PROCEDURE — 1100000001 HC PRIVATE ROOM DAILY

## 2025-04-18 PROCEDURE — 86308 HETEROPHILE ANTIBODY SCREEN: CPT | Performed by: STUDENT IN AN ORGANIZED HEALTH CARE EDUCATION/TRAINING PROGRAM

## 2025-04-18 PROCEDURE — 76536 US EXAM OF HEAD AND NECK: CPT | Performed by: STUDENT IN AN ORGANIZED HEALTH CARE EDUCATION/TRAINING PROGRAM

## 2025-04-18 RX ORDER — DEXAMETHASONE SODIUM PHOSPHATE 10 MG/ML
10 INJECTION INTRAMUSCULAR; INTRAVENOUS ONCE
Status: COMPLETED | OUTPATIENT
Start: 2025-04-18 | End: 2025-04-18

## 2025-04-18 RX ADMIN — DEXAMETHASONE SODIUM PHOSPHATE 10 MG: 10 INJECTION, SOLUTION INTRAMUSCULAR; INTRAVENOUS at 19:51

## 2025-04-18 RX ADMIN — CETIRIZINE HYDROCHLORIDE 10 MG: 10 TABLET, FILM COATED ORAL at 10:13

## 2025-04-18 RX ADMIN — AMPICILLIN SODIUM AND SULBACTAM SODIUM 3 G: 2; 1 INJECTION, POWDER, FOR SOLUTION INTRAMUSCULAR; INTRAVENOUS at 22:13

## 2025-04-18 RX ADMIN — ACETAMINOPHEN 975 MG: 325 TABLET, FILM COATED ORAL at 10:16

## 2025-04-18 RX ADMIN — AMPICILLIN SODIUM AND SULBACTAM SODIUM 3 G: 2; 1 INJECTION, POWDER, FOR SOLUTION INTRAMUSCULAR; INTRAVENOUS at 10:13

## 2025-04-18 RX ADMIN — Medication 1 TABLET: at 10:13

## 2025-04-18 RX ADMIN — AMPICILLIN SODIUM AND SULBACTAM SODIUM 3 G: 2; 1 INJECTION, POWDER, FOR SOLUTION INTRAMUSCULAR; INTRAVENOUS at 16:42

## 2025-04-18 RX ADMIN — ACETAMINOPHEN 975 MG: 325 TABLET, FILM COATED ORAL at 19:51

## 2025-04-18 RX ADMIN — AMPICILLIN SODIUM AND SULBACTAM SODIUM 3 G: 2; 1 INJECTION, POWDER, FOR SOLUTION INTRAMUSCULAR; INTRAVENOUS at 02:16

## 2025-04-18 RX ADMIN — DEXAMETHASONE SODIUM PHOSPHATE 10 MG: 10 INJECTION, SOLUTION INTRAMUSCULAR; INTRAVENOUS at 10:13

## 2025-04-18 ASSESSMENT — PAIN - FUNCTIONAL ASSESSMENT
PAIN_FUNCTIONAL_ASSESSMENT: 0-10

## 2025-04-18 ASSESSMENT — COGNITIVE AND FUNCTIONAL STATUS - GENERAL
MOBILITY SCORE: 24
DAILY ACTIVITIY SCORE: 24

## 2025-04-18 ASSESSMENT — PAIN SCALES - GENERAL
PAINLEVEL_OUTOF10: 0 - NO PAIN
PAINLEVEL_OUTOF10: 2
PAINLEVEL_OUTOF10: 3
PAINLEVEL_OUTOF10: 1

## 2025-04-18 ASSESSMENT — PAIN DESCRIPTION - ORIENTATION: ORIENTATION: LEFT

## 2025-04-18 ASSESSMENT — PAIN DESCRIPTION - LOCATION: LOCATION: NECK

## 2025-04-18 ASSESSMENT — PAIN DESCRIPTION - DESCRIPTORS: DESCRIPTORS: ACHING;SORE

## 2025-04-18 NOTE — NURSING NOTE
Following review, per Dr. Chu, order for parotid biopsy should be scheduled outpatient. Thank you

## 2025-04-18 NOTE — CARE PLAN
The patient's goals for the shift include Pt. Will remain safe and free from injury for the entire shift.    The clinical goals for the shift include Pt. Will remain safe and free from injury for the shift.

## 2025-04-18 NOTE — CONSULTS
Infectious disease progress note  Subjective   Cipriano Zaidi is a 29 y.o. female who presents to the hospital with complaints of L sided facial swelling. Symptoms have been ongoing for about 6 weeks.  Patient has undergone multiple courses of antibiotics including 2 course of azithromycin, Bactrim, and a course of Augmentin.  Patient presented to the hospital due to subjective fevers, increased pain in throat, increased swelling on the left side of her face, and difficulty swallowing.  In the ED patient was noted to have elevated ESR/CRP and CT imaging showing evidence of parotitis.  Patient denying any current fevers, chills, shortness of breath, difficulty breathing, past dental work, recent piercings, or any other new/acute symptoms.  Patient recently treated for ear infections with Augmentin.    Patient feels roughly the same this morning.  No new or acute complaints.      Objective   Range of Vitals (last 24 hours)  Heart Rate:  [72-85]   Temp:  [35.9 °C (96.6 °F)-36.1 °C (97 °F)]   BP: (115-129)/(59-69)   SpO2:  [97 %-99 %]   Daily Weight  04/16/25 : 81.6 kg (180 lb)    Body mass index is 34.01 kg/m².      Physical Exam  Physical Exam:  General:  Pleasant and cooperative. No apparent distress.  HEENT: Swollen submandibular and preauricular lymph nodes, left-sided facial swelling  Chest:  Clear to auscultation bilaterally. No wheezes, rales, or rhonchi.  CV:  Regular rate and rhythm. No murmurs    Abdomen: Abdomen is soft, non-tender, non-distended. BS +   Extremities:  No lower extremity edema or cyanosis.   Neurological:  AAOx3. No focal deficits.  Skin:  Warm and dry.    Relevant Results  Labs  Lab Results   Component Value Date    WBC 4.3 (L) 04/18/2025    HGB 13.1 04/18/2025    HCT 44.1 04/18/2025    MCV 70 (L) 04/18/2025     04/18/2025     Lab Results   Component Value Date    GLUCOSE 91 04/16/2025    CALCIUM 9.3 04/16/2025     (L) 04/16/2025    K 4.2 04/16/2025    CO2 20 (L) 04/16/2025      04/16/2025    BUN 11 04/16/2025    CREATININE 0.63 04/16/2025   ESR: --  Lab Results   Component Value Date    SEDRATE 57 (H) 04/16/2025     Lab Results   Component Value Date    CRP 1.04 (H) 04/16/2025     Lab Results   Component Value Date    ALT 29 04/16/2025    AST 26 04/16/2025    ALKPHOS 28 (L) 04/16/2025    BILITOT 0.4 04/16/2025       Assessment/Plan   #Peritonitis, failed outpatient management  - ENT following, has not had much improvement over 48 hours with IV ABX, will suggest IR guided biopsy of lesion to better characterize the area or repeat CT with removal of jewelry for better assessment, also recommending course of steroids  - Continue with Unasyn and vancomycin (started/17/25), may de-escalate Vanco if MRSA negative  - ID will continue to follow    I reviewed and interpreted all lab test imaging studies and documentations from other healthcare providers  I am monitoring for antibiotic side effects and toxicity     Michael Hatfield MD

## 2025-04-18 NOTE — PROGRESS NOTES
"Cipriano Zaidi is a 29 y.o. female on day 1 of admission presenting with Parotiditis.     Subjective   Patient has not appreciated any change in symptoms. Still having left parotid and neck discomfort.       Objective     Physical Exam  CONSTITUTIONAL: Well appearing female who appears stated age.  PSYCHIATRIC: Alert, appropriate mood and affect.  RESPIRATORY: Normal inspiration and expiration and chest wall expansion; no use of accessory muscles to breathe.  VOICE: Clear speech without hoarseness. No stridor nor stertor.  HEAD, FACE, AND SKIN: Symmetric facial feature; CN VII intact. No cutaneous masses or lesions were visualized. The parotid and submandibular glands were relatively normal to palpation. However, there is prominent isolated swelling in the left preauricular space that is suspicious for lymphadenopathy with the surrounding parotid gland tender to manipulation but otherwise soft.  OROPHARYNX: Tonsils 1+.  NECK: Visualization and palpation of the neck revealed no mass lesions, no thyromegaly or thyroid masses. No cutaneous lesions appreciated.  LYMPHATICS (CERVICAL): There were scattered palpable lymph nodes along the left jugulodigastric region with tenderness to palpation.  In the posterior triangle, submandibular triangle, jugulodigastric region, nor central neck.    Last Recorded Vitals  Blood pressure 115/59, pulse 72, temperature 36.1 °C (97 °F), resp. rate 18, height 1.549 m (5' 1\"), weight 81.6 kg (180 lb), SpO2 99%.  Intake/Output last 3 Shifts:  I/O last 3 completed shifts:  In: 700 (8.6 mL/kg) [I.V.:600 (7.3 mL/kg); IV Piggyback:100]  Out: - (0 mL/kg)   Weight: 81.6 kg     Relevant Results  Mono screen collected; in process  Assessment & Plan  Parotiditis    29 y.o. female seen for left-sided parotitis that has not resolved with multiple courses of antibiotics.  Patient exam concerning for lymphadenopathy of the parotid gland.  She has diffuse cervical lymphadenopathy of unclear etiology, " potentially secondary to parotitis or potentially from lingering pharyngitis following upper respiratory infection.    The patient has not appreciated much improvement over the last 48 hours with IV antibiosis. I would suggest IR-guided biopsy of this lesion to better characterize this area (not well visualized on CT due to artifact) and obtain tissue.  Alternatively a new CT neck with removal of patient's jewelry for better assessment to rule out abscess/lesion.    I would also recommend a course of steroids; one IV dose today and will continue oral burst tomorrow.    Mono testing collected.    [Discussed with primary team]    Prasanth Souza MD

## 2025-04-18 NOTE — PROGRESS NOTES
"Cipriano Zaidi is a 29 y.o. female on day 1 of admission presenting with Parotiditis.        Subjective   Seen this AM.  No significant improvements.   U/S and IR consult placed today.   ENT following.  Cont. IV Abx.         Objective     Last Recorded Vitals  /59   Pulse 72   Temp 36.1 °C (97 °F)   Resp 18   Ht 1.549 m (5' 1\")   Wt 81.6 kg (180 lb)   SpO2 99%   BMI 34.01 kg/m²     Intake/Output last 3 Shifts:  I/O last 3 completed shifts:  In: 700 (8.6 mL/kg) [I.V.:600 (7.3 mL/kg); IV Piggyback:100]  Out: - (0 mL/kg)   Weight: 81.6 kg       =========    RELEVANT RESULTS      ==========    Labs  Lab Results   Component Value Date    WBC 4.3 (L) 04/18/2025    HGB 13.1 04/18/2025    HCT 44.1 04/18/2025    MCV 70 (L) 04/18/2025     04/18/2025     Lab Results   Component Value Date    GLUCOSE 117 (H) 04/18/2025    CALCIUM 8.7 04/18/2025     04/18/2025    K 3.2 (L) 04/18/2025    CO2 24 04/18/2025     04/18/2025    BUN 9 04/18/2025    CREATININE 0.47 (L) 04/18/2025      Lab Results   Component Value Date    ALT 23 04/18/2025    AST 19 04/18/2025    ALKPHOS 28 (L) 04/18/2025    BILITOT 0.3 04/18/2025          Exam     GENERAL:   no distress, alert and cooperative  HEENT: Large amount of swelling left face parotid  CARDIOVASCULAR: RRR, no murmurs, 2+ equal pulses of the extremities, normal S1 and S 2  RESPIRATORY: Patent airways, CTAB, thorax symmetric, No significant wheezing, Rales or Rhonchi  ABDOMEN: Soft, Non-Tender, Normal Bowel Sounds, No Distention  SKIN: Warm and dry, no lesions, no rashes  EXTREMITIES: normal extremities, no significant cyanosis edema, contusions or wounds, no obsvious clubbing  NEURO: A&O x 3, CN II-XII grossly intact  PSYCH: Appropriate mood and behavior        =======     SCHEDULED MEDICATIONS     =======  Scheduled Medications[1]    ==========     PRN MEDICATIONS      =========  acetaminophen, 975 mg, q6h PRN  albuterol, 0.63 mg, q6h PRN  ketorolac, 15 mg, q6h " PRN  lidocaine, 1.25 mL, PRN  ondansetron, 4 mg, q6h PRN        ==============     DIET     ==============  Dietary Orders (From admission, onward)       Start     Ordered    04/17/25 0015  May Participate in Room Service  ( ROOM SERVICE MAY PARTICIPATE)  Once        Question:  .  Answer:  Yes    04/17/25 0014 04/16/25 2307  Adult diet Regular  Diet effective now        Question:  Diet type  Answer:  Regular    04/16/25 2306                    ======    Assessment/Plan      =======    ASSESSMENT:  Assessment & Plan  Parotiditis        ___________________________________________________    # Parotiditis  # Failure of outpatient treatment  # Asthma           PLAN:    Cont. IV Abx.   ENT And ID following.   Will obtain set of labs this AM.  US ordered  IR consult for biopsy        DVT Prophylaxis  Last Anticoag Admin            Orders not given:    enoxaparin (Lovenox) syringe 40 mg              Disclaimer: The timestamp of this note indicates when it was documented and does not necessarily correspond to the time the patient was evaluated or seen.        Pietro Villatoro DO         [1]   Scheduled medications   Medication Dose Route Frequency    ampicillin-sulbactam  3 g intravenous q6h    cetirizine  10 mg oral Daily    enoxaparin  40 mg subcutaneous q24h    lactobacillus acidophilus  1 tablet oral Daily    [START ON 4/19/2025] predniSONE  30 mg oral Daily

## 2025-04-18 NOTE — CARE PLAN
The patient's goals for the shift include  comfort, pain management and saftey    The clinical goals for the shift include patient's face swelling will continue to improve throughout entire shift.

## 2025-04-18 NOTE — PROGRESS NOTES
Vancomycin Dosing by Pharmacy- Cessation of Therapy    Consult to pharmacy for vancomycin dosing has been discontinued by the prescriber, pharmacy will sign off at this time.    Please call pharmacy if there are further questions or re-enter a consult if vancomycin is resumed.     Yesy Dan, PharmD

## 2025-04-19 VITALS
TEMPERATURE: 97 F | OXYGEN SATURATION: 96 % | WEIGHT: 180 LBS | HEIGHT: 61 IN | BODY MASS INDEX: 33.99 KG/M2 | RESPIRATION RATE: 16 BRPM | DIASTOLIC BLOOD PRESSURE: 63 MMHG | SYSTOLIC BLOOD PRESSURE: 112 MMHG | HEART RATE: 75 BPM

## 2025-04-19 PROCEDURE — 2500000001 HC RX 250 WO HCPCS SELF ADMINISTERED DRUGS (ALT 637 FOR MEDICARE OP): Performed by: INTERNAL MEDICINE

## 2025-04-19 PROCEDURE — 2500000004 HC RX 250 GENERAL PHARMACY W/ HCPCS (ALT 636 FOR OP/ED): Performed by: INTERNAL MEDICINE

## 2025-04-19 PROCEDURE — 2500000004 HC RX 250 GENERAL PHARMACY W/ HCPCS (ALT 636 FOR OP/ED): Performed by: STUDENT IN AN ORGANIZED HEALTH CARE EDUCATION/TRAINING PROGRAM

## 2025-04-19 PROCEDURE — 99239 HOSP IP/OBS DSCHRG MGMT >30: CPT | Performed by: INTERNAL MEDICINE

## 2025-04-19 RX ORDER — PREDNISONE 10 MG/1
30 TABLET ORAL DAILY
Qty: 15 TABLET | Refills: 0 | Status: SHIPPED | OUTPATIENT
Start: 2025-04-20 | End: 2025-04-25

## 2025-04-19 RX ORDER — AMOXICILLIN AND CLAVULANATE POTASSIUM 875; 125 MG/1; MG/1
1 TABLET, FILM COATED ORAL 2 TIMES DAILY
Qty: 10 TABLET | Refills: 0 | Status: SHIPPED | OUTPATIENT
Start: 2025-04-19 | End: 2025-04-24

## 2025-04-19 RX ADMIN — AMPICILLIN SODIUM AND SULBACTAM SODIUM 3 G: 2; 1 INJECTION, POWDER, FOR SOLUTION INTRAMUSCULAR; INTRAVENOUS at 04:53

## 2025-04-19 RX ADMIN — Medication 1 TABLET: at 09:34

## 2025-04-19 RX ADMIN — AMPICILLIN SODIUM AND SULBACTAM SODIUM 3 G: 2; 1 INJECTION, POWDER, FOR SOLUTION INTRAMUSCULAR; INTRAVENOUS at 09:34

## 2025-04-19 RX ADMIN — CETIRIZINE HYDROCHLORIDE 10 MG: 10 TABLET, FILM COATED ORAL at 09:34

## 2025-04-19 RX ADMIN — PREDNISONE 30 MG: 5 TABLET ORAL at 09:34

## 2025-04-19 ASSESSMENT — PAIN SCALES - GENERAL: PAINLEVEL_OUTOF10: 0 - NO PAIN

## 2025-04-19 NOTE — PROGRESS NOTES
25 1203   Discharge Planning   Living Arrangements Parent   Support Systems Parent;Family members   Assistance Needed None   Type of Residence Private residence   Home or Post Acute Services None   Expected Discharge Disposition Home   Does the patient need discharge transport arranged? No     TCC Note: Met with pt and family/friends at bedside. Introduced self and explained role on the Transition of Care team. Verified name, , demographics, insurance and PCP is [facility]. ER contact is Rachelle Lr phone: [phone]. Pt lives with Mother in a one level home with 4 JOSE. Pt was previously Independent with all ADLs and IADLs. Pt has 1 cat as a pet. Pt denies any recent falls, does not use assistive device, is not diabetic and does not use any home O2. [facility]acy is [facility] on [address]. Pt drove herself here to the hospital and will drive herself home. Melissa Cabello, MSN, RN, TCC.

## 2025-04-19 NOTE — CARE PLAN
The patient's goals for the shift include      The clinical goals for the shift include pt will have a decrease in left facial edema    Over the shift, the patient did not make progress toward the following goals. Barriers to progression include swollen lymph node. Recommendations to address these barriers include continue atbx treatment.

## 2025-04-19 NOTE — HOSPITAL COURSE
29 y.o. female who presents to the hospital with complaints of L sided facial swelling. Symptoms have been ongoing for about 6 weeks.  Patient has undergone multiple courses of antibiotics including 2 course of azithromycin, Bactrim, and a course of Augmentin.  Patient presented to the hospital due to subjective fevers, increased pain in throat, increased swelling on the left side of her face, and difficulty swallowing.  In the ED patient was noted to have elevated ESR/CRP and CT imaging showing evidence of parotitis.  Patient denying any current fevers, chills, shortness of breath, difficulty breathing, past dental work, recent piercings, or any other new/acute symptoms.  Patient recently treated for ear infections with Augmentin.  US completed of parotid and revealed     Heterogenous left parotid gland could relate to underlying inflammatory process with enlarged intraductal and regional abnormal looking lymph nodes which could be reactive.    ENT followed during stay.   Biopsy can be considered as outpatient if failure to continue to improve as it currently seems to be.   Patient tolerating PO Intake.   Labs unremarkable.   Steroids and abx given during stay and recommended to continue on DC by ENT - Dr. Souza.   Patient feels well otherwise and will follow up as outpatient.

## 2025-04-19 NOTE — DISCHARGE SUMMARY
Discharge Diagnosis  Parotiditis    Issues Requiring Follow-Up  Follow up with ENT    Test Results Pending At Discharge  Pending Labs       No current pending labs.            Hospital Course  29 y.o. female who presents to the hospital with complaints of L sided facial swelling. Symptoms have been ongoing for about 6 weeks.  Patient has undergone multiple courses of antibiotics including 2 course of azithromycin, Bactrim, and a course of Augmentin.  Patient presented to the hospital due to subjective fevers, increased pain in throat, increased swelling on the left side of her face, and difficulty swallowing.  In the ED patient was noted to have elevated ESR/CRP and CT imaging showing evidence of parotitis.  Patient denying any current fevers, chills, shortness of breath, difficulty breathing, past dental work, recent piercings, or any other new/acute symptoms.  Patient recently treated for ear infections with Augmentin.  US completed of parotid and revealed     Heterogenous left parotid gland could relate to underlying inflammatory process with enlarged intraductal and regional abnormal looking lymph nodes which could be reactive.    ENT followed during stay.   Biopsy can be considered as outpatient if failure to continue to improve as it currently seems to be.   Patient tolerating PO Intake.   Labs unremarkable.   Steroids and abx given during stay and recommended to continue on DC by ENT - Dr. Souza.   Patient feels well otherwise and will follow up as outpatient.     Discharge time spent 35 minutes.      Pertinent Physical Exam At Time of Discharge    GENERAL:   no distress, alert and cooperative  HEENT: Normal Inspection, Mucous membranes moist, No JVD, No Lymphadenopathy  CARDIOVASCULAR: RRR, no murmurs, 2+ equal pulses of the extremities, normal S1 and S 2  RESPIRATORY: Patent airways, CTAB, normal breath sounds with good chest expansion, thorax symmetric, No Wheezes, Rales or Rhonchi  ABDOMEN: Soft,  Non-Tender, Normal Bowel Sounds, No Distention  SKIN: Warm and dry, no lesions, no rashes  EXTREMITIES: normal extremities, no cyanosis edema, contusions or wounds, no clubbing  NEURO: A&O x 3, CN II-XII grossly intact  PSYCH: Appropriate mood and behavior      Home Medications     Medication List      START taking these medications     amoxicillin-clavulanate 875-125 mg tablet; Commonly known as: Augmentin;   Take 1 tablet by mouth 2 times a day for 5 days.   predniSONE 10 mg tablet; Commonly known as: Deltasone; Take 3 tablets   (30 mg) by mouth once daily for 5 days.; Start taking on: April 20, 2025     CONTINUE taking these medications     albuterol 0.63 mg/3 mL nebulizer solution   loratadine 10 mg tablet; Commonly known as: Claritin       Outpatient Follow-Up  No future appointments.    Pietro Villatoro, DO

## 2025-04-19 NOTE — CARE PLAN
The patient's goals for the shift include      The clinical goals for the shift include pt will have a decrease in left facial edema    Over the shift, the patient did not make progress toward the following goals. Barriers to progression include pt still has left facial edema

## 2025-04-22 ENCOUNTER — TELEPHONE (OUTPATIENT)
Dept: OTOLARYNGOLOGY | Facility: CLINIC | Age: 30
End: 2025-04-22
Payer: COMMERCIAL

## 2025-04-22 NOTE — TELEPHONE ENCOUNTER
Called patient per request of Dr. Souza to get her scheduled for a follow-up from ED in around 2 weeks. Voicemail with office number left for patient.

## 2025-04-30 NOTE — PROGRESS NOTES
SUBJECTIVE  Patient ID: Cipriano Zaidi is a 29 y.o. female who presents for New Patient Visit (Cipriano has a visit regarding Parotiditis. ).    History:  Seen as a follow-up for recent admission with recalcitrant parotitis. From my consult note 4/17/2025:  The patient reports that she was otherwise in good health when she had an upper respiratory infection some 6 to 8 weeks ago.  She developed swelling of the left parotid gland that was quite tender.  She has had several rounds of antibiotics including 2 rounds of Augmentin, a round of azithromycin, and a round of Bactrim without resolution of swelling.  She was seen by an outside ENT who thought it may be a reactive lymph node and advised patients.  She has tried sialagogues, parotid massage, and heat without improvement.  She has had waxing and waning exacerbations of swelling involving the entirety of the left face track into the eye and more recently has had worsening pain and swelling along the left neck.     Update 5/1/2025:  Since her admission the patient has noted persistent improvement of symptoms with decreased swelling of left face and decreased pain.  She attributes this to steroid therapy initiated in house.  No other concerns today.    Review of Systems  Complete ROS negative except as noted above or on patient intake form and as above.    OBJECTIVE  Physical Exam  CONSTITUTIONAL: Well appearing female who appears stated age.  PSYCHIATRIC: Alert, appropriate mood and affect.  RESPIRATORY: Normal inspiration and expiration and chest wall expansion; no use of accessory muscles to breathe.  VOICE: Clear speech without hoarseness. No stridor nor stertor.  HEAD, FACE, AND SKIN: Symmetric facial feature. No cutaneous masses or lesions were visualized. The parotid and submandibular glands were normal to palpation.  No tenderness to palpation today.  Previously noted left-sided preauricular swelling has resolved.  EYES: Pupils were equal in size. Extra-ocular  muscle function was intact. No nystagmus was observed. Vision was grossly intact.  EARS: External ears were normally formed with no lesions. The external auditory canals were clear. The tympanic membranes were intact and in the neutral position. No significant retraction pockets nor effusions were appreciated.  NOSE: Nasal dorsum was midline.  ORAL CAVITY: Lips were without lesions. Moist mucous membranes. No lesions appreciated along the gingiva, oral mucosa, nor tongue.  DENTITION: Grossly normal without obvious infection nor inflammation.  OROPHARYNX: No lesion nor mucosal abnormality. The uvula was normal appearing.  NECK: Visualization and palpation of the neck revealed no mass lesions, no thyromegaly or thyroid masses. No cutaneous lesions appreciated.  LYMPHATICS (CERVICAL): There were no palpable lymph nodes in the posterior triangle, submandibular triangle, jugulodigastric region, nor central neck.  NEUROLOGIC: Cranial nerves III-XII grossly intact and symmetric.  CN VII tested specifically and found to be intact and symmetric.     Radiology  CT neck [facility] 4/23/2025  IMPRESSION:  1. A 1.6 cm hypodense lesion deep to the left parotid  gland could represent a necrotic lymph node or branchial  cleft cyst. Follow-up or tissue diagnosis recommended.  2. A 1.5 cm lesion in the anterior left parotid gland could  represent an enlarged lymph node but other benign or  malignant etiologies are possible. Follow-up or tissue  diagnosis recommended.  3. A 8 mm hypodensity in the posterior sella could  represent a sellar lesion such as Rathke's cleft cyst but is  incompletely evaluated. MRI of the pituitary with and  without contrast is recommended for further evaluation.    I personally viewed the patient's recent CT neck performed at an outside institution.  There is some persistent inflammation of the left parotid gland with two hypodense lesions within the gland corresponding to enlarged lymph nodes noted on recent  ultrasound.    ASSESSMENT/PLAN  Diagnoses and all orders for this visit:  Parotiditis  -     US head neck soft tissue; Future  Localized enlarged lymph nodes  -     US head neck soft tissue; Future  Pituitary lesion (Multi)      29 y.o. female presenting with persistent left-sided parotitis in the setting of what appears to be obstructive lymphadenopathy.    Left-sided parotitis, left parotid lymphadenopathy  The patient was first met in the hospital where she was noted to have recalcitrant left-sided parotitis despite multiple rounds of antibiotics.  On exam she had very prominent swelling in the left preauricular space which corresponded to an ultrasound concerning for lymphadenopathy obstructing the parotid duct.  With steroids the patient has noted significant improvement in symptoms and on repeat exam her left parotid swelling has largely resolved.  We discussed her options including continued observation versus imaging to reassess the suspected lymph nodes over time.  I recommend that we obtain an ultrasound at 6 months to ensure that the areas of concern noted on imaging have resolved.  Requisition was provided.    Incidental sellar lesion  On reviewing the report from [facility] I initially missed discussing with the patient a small sellar lesion incidentally appreciated.  Recommendation was for sellar MRI which I am happy to order for her.  I was able to call the patient 5/2/25 to relay this information and order imaging for her.    This note was created using speech recognition transcription software. Despite proofreading, typographical errors may be present that affect the meaning of the content. Please contact my office with any questions.

## 2025-05-01 ENCOUNTER — OFFICE VISIT (OUTPATIENT)
Dept: OTOLARYNGOLOGY | Facility: CLINIC | Age: 30
End: 2025-05-01
Payer: COMMERCIAL

## 2025-05-01 VITALS
HEART RATE: 76 BPM | BODY MASS INDEX: 35.27 KG/M2 | OXYGEN SATURATION: 98 % | SYSTOLIC BLOOD PRESSURE: 108 MMHG | HEIGHT: 61 IN | TEMPERATURE: 98 F | WEIGHT: 186.8 LBS | DIASTOLIC BLOOD PRESSURE: 71 MMHG

## 2025-05-01 DIAGNOSIS — K11.20 PAROTIDITIS: Primary | ICD-10-CM

## 2025-05-01 DIAGNOSIS — E23.7 PITUITARY LESION (MULTI): ICD-10-CM

## 2025-05-01 DIAGNOSIS — R59.0 LOCALIZED ENLARGED LYMPH NODES: ICD-10-CM

## 2025-05-01 PROCEDURE — 99214 OFFICE O/P EST MOD 30 MIN: CPT | Performed by: STUDENT IN AN ORGANIZED HEALTH CARE EDUCATION/TRAINING PROGRAM

## 2025-05-01 PROCEDURE — 1036F TOBACCO NON-USER: CPT | Performed by: STUDENT IN AN ORGANIZED HEALTH CARE EDUCATION/TRAINING PROGRAM

## 2025-05-01 PROCEDURE — 3008F BODY MASS INDEX DOCD: CPT | Performed by: STUDENT IN AN ORGANIZED HEALTH CARE EDUCATION/TRAINING PROGRAM

## 2025-05-01 RX ORDER — FAMOTIDINE 20 MG/1
20 TABLET, FILM COATED ORAL
COMMUNITY
Start: 2025-02-07

## 2025-05-01 RX ORDER — DUPILUMAB 300 MG/2ML
300 INJECTION, SOLUTION SUBCUTANEOUS
COMMUNITY
Start: 2025-02-07

## 2025-05-01 RX ORDER — ALBUTEROL SULFATE 90 UG/1
2 INHALANT RESPIRATORY (INHALATION) EVERY 4 HOURS PRN
COMMUNITY
Start: 2019-01-09

## 2025-05-01 ASSESSMENT — ENCOUNTER SYMPTOMS
LOSS OF SENSATION IN FEET: 0
OCCASIONAL FEELINGS OF UNSTEADINESS: 0
DEPRESSION: 0

## 2025-05-01 ASSESSMENT — PAIN SCALES - GENERAL: PAINLEVEL_OUTOF10: 0-NO PAIN

## 2025-07-02 ENCOUNTER — APPOINTMENT (OUTPATIENT)
Dept: OTOLARYNGOLOGY | Facility: CLINIC | Age: 30
End: 2025-07-02
Payer: COMMERCIAL

## 2025-09-03 ENCOUNTER — APPOINTMENT (OUTPATIENT)
Dept: RADIOLOGY | Facility: HOSPITAL | Age: 30
End: 2025-09-03
Payer: COMMERCIAL